# Patient Record
Sex: MALE | Race: WHITE
[De-identification: names, ages, dates, MRNs, and addresses within clinical notes are randomized per-mention and may not be internally consistent; named-entity substitution may affect disease eponyms.]

---

## 2020-01-30 ENCOUNTER — HOSPITAL ENCOUNTER (OUTPATIENT)
Dept: HOSPITAL 47 - ORWHC2ENDO | Age: 60
End: 2020-01-30
Attending: INTERNAL MEDICINE
Payer: COMMERCIAL

## 2020-01-30 VITALS — RESPIRATION RATE: 14 BRPM | DIASTOLIC BLOOD PRESSURE: 87 MMHG | SYSTOLIC BLOOD PRESSURE: 132 MMHG | HEART RATE: 68 BPM

## 2020-01-30 VITALS — TEMPERATURE: 97.3 F

## 2020-01-30 VITALS — BODY MASS INDEX: 28.3 KG/M2

## 2020-01-30 DIAGNOSIS — Z79.899: ICD-10-CM

## 2020-01-30 DIAGNOSIS — I10: ICD-10-CM

## 2020-01-30 DIAGNOSIS — F41.9: ICD-10-CM

## 2020-01-30 DIAGNOSIS — K64.8: ICD-10-CM

## 2020-01-30 DIAGNOSIS — G47.33: ICD-10-CM

## 2020-01-30 DIAGNOSIS — Z95.2: ICD-10-CM

## 2020-01-30 DIAGNOSIS — F32.9: ICD-10-CM

## 2020-01-30 DIAGNOSIS — Z12.11: Primary | ICD-10-CM

## 2020-01-30 DIAGNOSIS — Z79.82: ICD-10-CM

## 2020-01-30 DIAGNOSIS — Z99.89: ICD-10-CM

## 2020-01-30 NOTE — P.PCN
Date of Procedure: 01/30/20


Procedure(s) Performed: 


BRIEF HISTORY: Patient is a 59-year-old pleasant male scheduled for an elective 

colonoscopy as a part of screening for colorectal neoplasia.  His last coloscopy

was 11 years ago.





PROCEDURE PERFORMED: Colonoscopy. 





PREOPERATIVE DIAGNOSIS: Screening for colon cancer. 





IV sedation per Anesthesia. 





PROCEDURE: After informed consent was obtained, the patient, was brought into 

the endoscopy unit. IV sedation was administered by Anesthesia under continuous 

monitoring.  Digital rectal examination was normal. Initially the Olympus CF-160

flexible video colonoscope was then inserted in the rectum, gradually advanced 

into the cecum without any difficulty. Careful examination was performed as the 

scope was gradually being withdrawn. Ileocecal valve and the appendiceal orifice

were visualized and appeared normal.  Prep was excellent. Mucosa of the cecum, 

ascending colon, transverse colon, descending colon, sigmoid colon, and rectum 

appeared normal. Retroflexion was performed in the rectum and small internal 

hemorrhoids were seen. The patient tolerated the procedure well. 





IMPRESSION: 


Normal-appearing colon from rectum to cecum with no evidence of colorectal 

neoplasia .


Small internal hemorrhoids.





RECOMMENDATIONS:  Findings of this examination were discussed with the patient 

as well as his family.  He was advised to have a repeat screening colonoscopy in

10 years.

## 2025-05-21 ENCOUNTER — HOSPITAL ENCOUNTER (OUTPATIENT)
Dept: HOSPITAL 47 - RADXRMAIN | Age: 65
Discharge: HOME | End: 2025-05-21
Attending: UROLOGY
Payer: MEDICARE

## 2025-05-21 DIAGNOSIS — Z18.10: Primary | ICD-10-CM

## 2025-05-21 PROCEDURE — 70030 X-RAY EYE FOR FOREIGN BODY: CPT
